# Patient Record
Sex: MALE | Race: BLACK OR AFRICAN AMERICAN | Employment: UNEMPLOYED | URBAN - METROPOLITAN AREA
[De-identification: names, ages, dates, MRNs, and addresses within clinical notes are randomized per-mention and may not be internally consistent; named-entity substitution may affect disease eponyms.]

---

## 2017-01-12 ENCOUNTER — TELEPHONE (OUTPATIENT)
Dept: FAMILY MEDICINE | Facility: CLINIC | Age: 37
End: 2017-01-12

## 2017-01-12 NOTE — TELEPHONE ENCOUNTER
"RN returned call to patient. Patient wondering until what age patient can have HPV vaccine. Per CDC recommendation, before age 26 years. Patient verbalized understanding. Discussed other means of prevention using barrier method (condoms). Patient reports using condoms during sex to prevent against infections. Patient also wanted to know if there were any other immunizations patient would need to be up to date. Per [facility] review, patient is due for Influenza. Patient states he does not want the flu vaccine. States his mom \"gets the flu from flu vaccine.\" RN provided education that influenza vaccine does not cause the flu; however, may experience flu like symptoms. Patient states he will think about it more.    Thi Gibson RN    "

## 2017-01-12 NOTE — TELEPHONE ENCOUNTER
[facility] phone call message- patient requesting to speak with PCP or provider:    PCP: Holly Antoine    Additional Comments: Patient called to inquire about HPV vaccine. Please return call to discuss concerns.     Is a call back needed? Yes    Patient informed that it may take up to 2 business days to hear back from PCP:Yes    OK to leave a message on voice mail? Yes    Primary language: English      needed? No    Call taken on January 12, 2017 at 11:30 AM by Aleida Mera

## 2017-01-23 ENCOUNTER — TELEPHONE (OUTPATIENT)
Dept: FAMILY MEDICINE | Facility: CLINIC | Age: 37
End: 2017-01-23

## 2017-01-23 NOTE — TELEPHONE ENCOUNTER
"[facility] phone call message- patient requesting to speak with PCP or provider:    PCP: Holly Antoine    Additional Comments: Patient is requesting to speak with his PCP regarding a \"Medical Dependency Form\" that Dr. Antoine filled out for him. Patient declined to provide additional details, but states he only wants to speak directly to Dr. Antoine. Declined to speak with nurse.    Is a call back needed? Yes    Patient informed that it may take up to 2 business days to hear back from PCP:Yes    OK to leave a message on voice mail? Yes    Primary language: English      needed? No    Call taken on January 23, 2017 at 11:31 AM by Lucas Max    "

## 2017-01-30 NOTE — TELEPHONE ENCOUNTER
Message routed to PCP at high priority. Patient would like to speak directly with PCP. Declined to speak with triage per note below. Please follow up regarding form.    Thi Gibson RN

## 2017-01-30 NOTE — TELEPHONE ENCOUNTER
Patient stopped into the clinic in regards to messages below. Sent page to Dr. LIZA Antoine as it has been a week since initial call to call patient today. Informed patient that page was sent.

## 2017-01-30 NOTE — TELEPHONE ENCOUNTER
" Message Return    1/30/2017  5:20 PM    Message returned by Holly Antoine  Patient: Gary Cervantes   Phone number-  [phone] (home)     return their call  Phone conversation with: Patient    Situation: Gary Cervantes  Is a 36 year old  male who is calling because of  \"medical form\" for disability. Pt is asking why form is filled for 3 months instead of the usual 6 months.    Reviewed scanned form. Duration of condition is life long (evelopmental disability). On page 2 it states it will last more than 45 days. On page three it states next assessment in 3 months.    I think that is the cause of the confusion.    I apologized for the patient for any inconvenience. I will refill the form indicating 6 months as I believe his medical condition is life long. 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 His chronic anxiety and social phobia are likely due to the developmental disability.    Pt will bring form to clinic to be filled again.       Holly Maldonado M.D  [facility] Resident         "

## 2017-01-30 NOTE — TELEPHONE ENCOUNTER
Patient has not yet heard from his PCP and the request has been longer than 40 hours. Please call patient. Thank you!

## 2019-09-29 ENCOUNTER — TRANSFERRED RECORDS (OUTPATIENT)
Dept: HEALTH INFORMATION MANAGEMENT | Facility: CLINIC | Age: 39
End: 2019-09-29